# Patient Record
Sex: FEMALE | Race: OTHER | NOT HISPANIC OR LATINO
[De-identification: names, ages, dates, MRNs, and addresses within clinical notes are randomized per-mention and may not be internally consistent; named-entity substitution may affect disease eponyms.]

---
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
## 2022-09-01 ENCOUNTER — LABORATORY RESULT (OUTPATIENT)
Age: 22
End: 2022-09-01

## 2022-09-02 ENCOUNTER — APPOINTMENT (OUTPATIENT)
Dept: OBGYN | Facility: CLINIC | Age: 22
End: 2022-09-02

## 2022-09-02 VITALS
OXYGEN SATURATION: 100 % | SYSTOLIC BLOOD PRESSURE: 121 MMHG | HEART RATE: 99 BPM | WEIGHT: 234 LBS | DIASTOLIC BLOOD PRESSURE: 79 MMHG

## 2022-09-02 DIAGNOSIS — Z30.09 ENCOUNTER FOR OTHER GENERAL COUNSELING AND ADVICE ON CONTRACEPTION: ICD-10-CM

## 2022-09-02 DIAGNOSIS — Z01.419 ENCOUNTER FOR GYNECOLOGICAL EXAMINATION (GENERAL) (ROUTINE) W/OUT ABNORMAL FINDINGS: ICD-10-CM

## 2022-09-02 PROCEDURE — 99214 OFFICE O/P EST MOD 30 MIN: CPT

## 2022-09-02 NOTE — HISTORY OF PRESENT ILLNESS
[FreeTextEntry1] : here for BC consult \par stopped the GENEVA\par was on GENEVA for 2 years, gained 50 lbs \par desires paragard \par

## 2022-09-09 ENCOUNTER — APPOINTMENT (OUTPATIENT)
Dept: OBGYN | Facility: CLINIC | Age: 22
End: 2022-09-09

## 2022-09-09 VITALS — DIASTOLIC BLOOD PRESSURE: 77 MMHG | WEIGHT: 237 LBS | SYSTOLIC BLOOD PRESSURE: 122 MMHG

## 2022-09-09 DIAGNOSIS — Z30.430 ENCOUNTER FOR INSERTION OF INTRAUTERINE CONTRACEPTIVE DEVICE: ICD-10-CM

## 2022-09-09 PROCEDURE — 58300 INSERT INTRAUTERINE DEVICE: CPT

## 2022-10-07 ENCOUNTER — APPOINTMENT (OUTPATIENT)
Dept: OBGYN | Facility: CLINIC | Age: 22
End: 2022-10-07

## 2022-10-07 ENCOUNTER — ASOB RESULT (OUTPATIENT)
Age: 22
End: 2022-10-07

## 2022-10-07 VITALS
HEART RATE: 88 BPM | DIASTOLIC BLOOD PRESSURE: 78 MMHG | OXYGEN SATURATION: 98 % | SYSTOLIC BLOOD PRESSURE: 119 MMHG | WEIGHT: 239 LBS

## 2022-10-07 PROCEDURE — 76830 TRANSVAGINAL US NON-OB: CPT

## 2023-02-24 ENCOUNTER — NON-APPOINTMENT (OUTPATIENT)
Age: 23
End: 2023-02-24

## 2023-03-01 ENCOUNTER — APPOINTMENT (OUTPATIENT)
Dept: BARIATRICS | Facility: CLINIC | Age: 23
End: 2023-03-01
Payer: MEDICAID

## 2023-03-01 VITALS
TEMPERATURE: 97.6 F | WEIGHT: 239 LBS | SYSTOLIC BLOOD PRESSURE: 136 MMHG | HEART RATE: 112 BPM | OXYGEN SATURATION: 100 % | HEIGHT: 64 IN | DIASTOLIC BLOOD PRESSURE: 84 MMHG | BODY MASS INDEX: 40.8 KG/M2

## 2023-03-01 DIAGNOSIS — Z00.00 ENCOUNTER FOR GENERAL ADULT MEDICAL EXAMINATION W/OUT ABNORMAL FINDINGS: ICD-10-CM

## 2023-03-01 PROCEDURE — 99204 OFFICE O/P NEW MOD 45 MIN: CPT

## 2023-03-01 NOTE — ADDENDUM
[FreeTextEntry1] : It was my pleasure to interact with Ms. SOUTH UMANZOR today. In summary, Ms. SOUTH UMANZOR has morbid obesity refractory to medical and dietary efforts for which She could benefit from weight loss surgery.  We discussed in detail the Taylor-en-Y gastric bypass, sleeve gastrectomy, and modified duodenal switch (NIRMAL/SIPS). She understood that these procedures are done primarily minimally invasively (laparoscopically or robotically), but that there is a possibility of conversion to laparotomy. In this particular case, with their body mass index we discussed realistic expectations with respect to weight loss.  Approximately 50% of excess weight will be lost if She has gastric bypass or sleeve gastrectomy, or, approximately 60% if She  has NIRMAL/SIPS.  In order to maintain weight loss or lose more weight, She will be required to modify diet and exercise habits (the "tool" concept of weight loss surgery).  We discussed the necessity for continuous, life-long medical care following surgery and the necessity for taking mineral and vitamin supplements daily for the rest of life. We discussed the importance of high protein diet and daily exercise for maximal success.\par \par We discussed complications that may follow bariatric surgery that include, but are not limited to, respiratory problems, pneumonia, thrombophlebitis, and pulmonary embolus.  We also discussed intra-abdominal complications including anastomotic leak, intra-abdominal infections, portal vein thrombosis and death that may result from bariatric surgery.  We discussed that there may be other complications related to a specific type of surgery, such as that gastric bypass patients may have severe dumping secondary to dietary indiscretion. With respect to sleeve gastrectomy we discussed the chances of having refractory GERD that may require intervention in the future including conversion to gastric bypass. We also discussed postoperative DVT prophylaxis to decrease the incidence of deep vein thrombosis when indicated. \par \par I specifically addressed the patient regarding pregnancy.  Weight loss surgery patients should not become pregnant in the first two years following surgery because of the high risk of fetal malformation and miscarriage.\par \par The prolonged discussion (greater than 45 minutes) centered primarily on the indications for surgery and evaluation of the risk/benefit ratio for Ms. SOUTH UMANZOR and other weight loss alternatives. I answered all questions about bariatric surgery and possible complications to the best of my ability.\par \par Once the preoperative evaluation is complete, I will review the chart and schedule the patient for a follow-up visit in order to answer any questions prior to scheduling surgery.\par \par Plan:\par Nutrition, Psych evaluations\par Medical, pulm evaluations\par EGD\par Interested in sleeve gastrectomy\par \par I, Dr. Reyna Rasmussen, spent 50 minutes with the patient >50% counseling/coordination of care including, reviewing the patient's history, performing an examination, reviewing relevant labs and radiographic imaging, reviewing PCP and consultant notes, discussion of medical and surgical management of the diagnosis as well as associated risks and benefits, and completing documentation.\par \par

## 2023-03-01 NOTE — ASSESSMENT
[FreeTextEntry1] : Pt is a 21 y/o F with pmhx of morbid obesity BMI 41, anxiety and depression following psychology, who presents today feeling well here for evaluation for bariatric sx. Pt states she has struggled with her wt since childhood and reports her current wt of 239 lbs is the most she has ever weighed. Pt denies any daily GERD symptoms but reports experiencing symptoms primarily during the summer months, pt not sure why. Denies ever having an EGD, will obtain preop. Reports previous tooth extraction sx, denies any abdominal procedures. Reports penicillin allergy. States she currently works as a  and is in school right now for ELS before starting [employer]. Denies any vaping or drug use, reports social use. States she currently lives at home with her family and she has not told her family as of yet that she is pursuing wt loss sx. Recommended pt to rewatch the online seminar with her family so they are more familiar with bariatric sx. Pt expresses interest in VSG.

## 2023-03-01 NOTE — HISTORY OF PRESENT ILLNESS
[de-identified] : Pt is a 23 y/o F with pmhx of morbid obesity BMI 41, anxiety and depression following psychology, who presents today feeling well here for evaluation for bariatric sx. Pt states she has struggled with her wt since childhood and reports her current wt of 239 lbs is the most she has ever weighed. Pt denies any daily GERD symptoms but reports experiencing symptoms primarily during the summer months, pt not sure why. Denies ever having an EGD, will obtain preop. Reports previous tooth extraction sx, denies any abdominal procedures. Reports penicillin allergy. States she currently works as a  and is in school right now for ELS before starting [employer]. Denies any vaping or drug use, reports social use. States she currently lives at home with her family and she has not told her family as of yet that she is pursuing wt loss sx. Recommended pt to rewatch the online seminar with her family so they are more familiar with bariatric sx. Pt expresses interest in VSG. We discussed at length surgical and non-surgical options and that non surgical approaches are unlikely to lead to long term, sustained weight loss. We also discussed that surgery alone is unlikely to be successful but should rather be seen as a tool for weight loss to be integrated with physical activity and nutritional counseling. The patient verbalized understanding and agrees to proceed with the evaluation. All risks of surgery were explained to the patient including the risks of leaks, infections, blood clots and death.\par \par \par

## 2023-03-01 NOTE — PLAN
[FreeTextEntry1] : pt to begin workup for bariatric sx\par obtain psych eval from personal psych and \par pt interested in VSG

## 2023-03-07 ENCOUNTER — NON-APPOINTMENT (OUTPATIENT)
Age: 23
End: 2023-03-07

## 2023-03-08 ENCOUNTER — OUTPATIENT (OUTPATIENT)
Dept: OUTPATIENT SERVICES | Facility: HOSPITAL | Age: 23
LOS: 1 days | End: 2023-03-08
Payer: COMMERCIAL

## 2023-03-08 ENCOUNTER — APPOINTMENT (OUTPATIENT)
Dept: PULMONOLOGY | Facility: CLINIC | Age: 23
End: 2023-03-08
Payer: MEDICAID

## 2023-03-08 VITALS
BODY MASS INDEX: 41.66 KG/M2 | OXYGEN SATURATION: 100 % | HEART RATE: 104 BPM | WEIGHT: 244 LBS | TEMPERATURE: 98.2 F | DIASTOLIC BLOOD PRESSURE: 85 MMHG | HEIGHT: 64 IN | SYSTOLIC BLOOD PRESSURE: 137 MMHG

## 2023-03-08 DIAGNOSIS — Z91.09 OTHER ALLERGY STATUS, OTHER THAN TO DRUGS AND BIOLOGICAL SUBSTANCES: ICD-10-CM

## 2023-03-08 DIAGNOSIS — Z82.49 FAMILY HISTORY OF ISCHEMIC HEART DISEASE AND OTHER DISEASES OF THE CIRCULATORY SYSTEM: ICD-10-CM

## 2023-03-08 DIAGNOSIS — Z83.49 FAMILY HISTORY OF OTHER ENDOCRINE, NUTRITIONAL AND METABOLIC DISEASES: ICD-10-CM

## 2023-03-08 DIAGNOSIS — R06.83 SNORING: ICD-10-CM

## 2023-03-08 DIAGNOSIS — Z83.3 FAMILY HISTORY OF DIABETES MELLITUS: ICD-10-CM

## 2023-03-08 PROCEDURE — 71046 X-RAY EXAM CHEST 2 VIEWS: CPT | Mod: 26

## 2023-03-08 PROCEDURE — 71046 X-RAY EXAM CHEST 2 VIEWS: CPT

## 2023-03-08 PROCEDURE — 99204 OFFICE O/P NEW MOD 45 MIN: CPT

## 2023-03-08 NOTE — PHYSICAL EXAM
[No Acute Distress] : no acute distress [Normal Oropharynx] : normal oropharynx [IV] : Mallampati Class: IV [Normal Appearance] : normal appearance [No Neck Mass] : no neck mass [Normal Rate/Rhythm] : normal rate/rhythm [Normal S1, S2] : normal s1, s2 [No Murmurs] : no murmurs [No Resp Distress] : no resp distress [Clear to Auscultation Bilaterally] : clear to auscultation bilaterally [No Abnormalities] : no abnormalities [Benign] : benign [Normal Gait] : normal gait [No Clubbing] : no clubbing [No Cyanosis] : no cyanosis [No Edema] : no edema [FROM] : FROM [Normal Color/ Pigmentation] : normal color/ pigmentation [Oriented x3] : oriented x3 [Normal Affect] : normal affect

## 2023-03-09 NOTE — CONSULT LETTER
[Dear  ___] : Dear  [unfilled], [Consult Letter:] : I had the pleasure of evaluating your patient, [unfilled]. [Please see my note below.] : Please see my note below. [Consult Closing:] : Thank you very much for allowing me to participate in the care of this patient.  If you have any questions, please do not hesitate to contact me. [FreeTextEntry3] : Sincerely\par \par Boone Olivas MD [facility]\par , [facility] [employer]\par Associate , Pulmonary and Critical Care Fellowship\par Pulmonary and Critical Care\par [facility]\par Phone: [phone]\par

## 2023-03-09 NOTE — REVIEW OF SYSTEMS
[Seasonal Allergies] : seasonal allergies [Depression] : depression [Anxiety] : anxiety [Obesity] : obesity [Negative] : Neurologic [Rash] : no rash

## 2023-03-09 NOTE — ASSESSMENT
[FreeTextEntry1] : Reviewed:\par -Bariatric surgery notes\par -Labs--CBC w/ no eos \par \par #allergies\par Environmental allergies and wheeze previously requring inhaler and ED visits for nebulizers but not currently using inhalers or sx since January. No family hx of pulmoary disease. Unclear if underlying asthma or reactive airways to environmental exposures. Exam benign today. \par -will obtain PFTs today and 6MWT\par -will hold off on inhalers for now but may warrant asthma panel or further work up since sx are only intermittent and with certain exposures and not persistent\par \par #pre-op \par No prior surgies would likely be low risk ARISCAT score 15 for pulmonary complications post surgery. Although will review PFTs as above given sx above. \par -CXR ordered\par -HST ordered \par \par Follow up when HST and PFTs complete to discuss results. \par \par \par

## 2023-03-09 NOTE — HISTORY OF PRESENT ILLNESS
[Never] : never [TextBox_4] : Pt is a 21 y/o F with pmhx of morbid obesity BMI 41, anxiety and depression following psychology, who presents for pre-op pulmonary evaluation for bariatric sx. \par \par Pt reports hx of allergies with episodes of cough and wheezing/sneezing if exposed to strong smells/irritants or cats. She was seeing an allergist for desensitization in fall of 2022 but made sx worse and required inhalers so stopped the process. She is not currently taking any inhalers. Gets seasonal allergies as well. Last episode of wheezing in 1/22 after exposure to cat. Previously required ED visits for nebulizer treatments but last ED visit was 2014. No nocturnal sx. Snores at night, unknown if witnessed apneas. Family hx of snoring but no pulmonary problems or dx of sleep disorders. ESS 4. Works as . No smoking or vaping. No prior anesthesia. Denies shortness of breath, chest pain. \par  [ESS] : 4

## 2023-03-20 ENCOUNTER — APPOINTMENT (OUTPATIENT)
Dept: BARIATRICS | Facility: CLINIC | Age: 23
End: 2023-03-20

## 2023-03-31 ENCOUNTER — APPOINTMENT (OUTPATIENT)
Dept: PULMONOLOGY | Facility: CLINIC | Age: 23
End: 2023-03-31
Payer: MEDICAID

## 2023-03-31 VITALS
OXYGEN SATURATION: 99 % | HEART RATE: 77 BPM | WEIGHT: 236 LBS | TEMPERATURE: 97.9 F | DIASTOLIC BLOOD PRESSURE: 80 MMHG | RESPIRATION RATE: 12 BRPM | SYSTOLIC BLOOD PRESSURE: 115 MMHG | HEIGHT: 64 IN | BODY MASS INDEX: 40.29 KG/M2

## 2023-03-31 PROCEDURE — 94618 PULMONARY STRESS TESTING: CPT

## 2023-03-31 PROCEDURE — 94010 BREATHING CAPACITY TEST: CPT

## 2023-03-31 PROCEDURE — 94729 DIFFUSING CAPACITY: CPT

## 2023-03-31 PROCEDURE — 99214 OFFICE O/P EST MOD 30 MIN: CPT | Mod: 25

## 2023-03-31 PROCEDURE — 94726 PLETHYSMOGRAPHY LUNG VOLUMES: CPT

## 2023-03-31 PROCEDURE — ZZZZZ: CPT

## 2023-03-31 NOTE — HISTORY OF PRESENT ILLNESS
[Never] : never [TextBox_4] : Pt is a 21 y/o F with pmhx of morbid obesity BMI 41, anxiety and depression following psychology, who presents for pre-op pulmonary evaluation for bariatric sx. \par \par Pt reports hx of allergies with episodes of cough and wheezing/sneezing if exposed to strong smells/irritants or cats. She was seeing an allergist for desensitization in fall of 2022 but made sx worse and required inhalers so stopped the process. She is not currently taking any inhalers. Gets seasonal allergies as well. Last episode of wheezing in 1/22 after exposure to cat. Previously required ED visits for nebulizer treatments but last ED visit was 2014. No nocturnal sx. Snores at night, unknown if witnessed apneas. Family hx of snoring but no pulmonary problems or dx of sleep disorders. ESS 4. Works as . No smoking or vaping. No prior anesthesia. Denies shortness of breath, chest pain. \par \par 3/31/23:\par PFT was done. Patient picked up sleep study equipment today.

## 2023-03-31 NOTE — PHYSICAL EXAM
[No Acute Distress] : no acute distress [Well Nourished] : well nourished [Normal Oropharynx] : normal oropharynx [II] : Mallampati Class: II [Normal Appearance] : normal appearance [Normal Rate/Rhythm] : normal rate/rhythm [Normal S1, S2] : normal s1, s2 [No Resp Distress] : no resp distress [Clear to Auscultation Bilaterally] : clear to auscultation bilaterally [Benign] : benign [No Clubbing] : no clubbing [No Cyanosis] : no cyanosis

## 2023-03-31 NOTE — REVIEW OF SYSTEMS
[Fever] : no fever [Chills] : no chills [Dry Eyes] : no dry eyes [Eye Irritation] : no eye irritation [Cough] : no cough [Sputum] : no sputum [Chest Discomfort] : no chest discomfort [Orthopnea] : no orthopnea [Hay Fever] : no hay fever [Nasal Discharge] : no nasal discharge [GERD] : no gerd [Diarrhea] : no diarrhea [Arthralgias] : no arthralgias [Trauma/ Injury] : no trauma/ injury [Raynaud] : no raynaud [Telangiectasias] : no telangiectasias

## 2023-03-31 NOTE — DISCUSSION/SUMMARY
[FreeTextEntry1] : Reviewed:\par -Bariatric surgery notes\par -Labs--CBC w/ no eos \par \par #allergies\par Environmental allergies and wheeze previously requring inhaler and ED visits for nebulizers but not currently using inhalers or sx since January. No family hx of pulmoary disease. Unclear if underlying asthma or reactive airways to environmental exposures. Exam benign today. PFT showed normal spirometery \par -will hold off on inhalers for now but may warrant asthma panel or further work up since sx are only intermittent and with certain exposures and not persistent\par \par #pre-op \par No prior surgies would likely be low risk ARISCAT score 15 for pulmonary complications post surgery. Although will review PFTs as above given sx above. \par -CXR did not show any abnormality.\par -HST ordered (still pending)\par \par Follow up after HST

## 2023-04-03 ENCOUNTER — APPOINTMENT (OUTPATIENT)
Dept: SLEEP CENTER | Facility: HOME HEALTH | Age: 23
End: 2023-04-03
Payer: MEDICAID

## 2023-04-03 ENCOUNTER — RESULT REVIEW (OUTPATIENT)
Age: 23
End: 2023-04-03

## 2023-04-03 ENCOUNTER — OUTPATIENT (OUTPATIENT)
Dept: OUTPATIENT SERVICES | Facility: HOSPITAL | Age: 23
LOS: 1 days | Discharge: HOME | End: 2023-04-03
Payer: COMMERCIAL

## 2023-04-03 ENCOUNTER — TRANSCRIPTION ENCOUNTER (OUTPATIENT)
Age: 23
End: 2023-04-03

## 2023-04-03 ENCOUNTER — OUTPATIENT (OUTPATIENT)
Dept: OUTPATIENT SERVICES | Facility: HOSPITAL | Age: 23
LOS: 1 days | End: 2023-04-03
Payer: COMMERCIAL

## 2023-04-03 VITALS — BODY MASS INDEX: 40.46 KG/M2 | WEIGHT: 237 LBS | HEIGHT: 64 IN

## 2023-04-03 VITALS — HEIGHT: 64 IN | WEIGHT: 235.89 LBS

## 2023-04-03 PROCEDURE — 95800 SLP STDY UNATTENDED: CPT

## 2023-04-03 PROCEDURE — 43239 EGD BIOPSY SINGLE/MULTIPLE: CPT

## 2023-04-03 PROCEDURE — 88305 TISSUE EXAM BY PATHOLOGIST: CPT | Mod: 26

## 2023-04-03 PROCEDURE — 88305 TISSUE EXAM BY PATHOLOGIST: CPT

## 2023-04-03 PROCEDURE — 95800 SLP STDY UNATTENDED: CPT | Mod: 26

## 2023-04-04 DIAGNOSIS — G47.33 OBSTRUCTIVE SLEEP APNEA (ADULT) (PEDIATRIC): ICD-10-CM

## 2023-04-04 LAB — SURGICAL PATHOLOGY STUDY: SIGNIFICANT CHANGE UP

## 2023-04-05 ENCOUNTER — APPOINTMENT (OUTPATIENT)
Dept: PULMONOLOGY | Facility: CLINIC | Age: 23
End: 2023-04-05

## 2023-04-10 ENCOUNTER — NON-APPOINTMENT (OUTPATIENT)
Age: 23
End: 2023-04-10

## 2023-04-10 LAB
25(OH)D3 SERPL-MCNC: 22.5 NG/ML
A-TOCOPHEROL VIT E SERPL-MCNC: 8.2 MG/L
ALBUMIN SERPL ELPH-MCNC: 4.3 G/DL
ALP BLD-CCNC: 73 U/L
ALT SERPL-CCNC: 19 U/L
ANION GAP SERPL CALC-SCNC: 16 MMOL/L
APPEARANCE: CLEAR
AST SERPL-CCNC: 16 U/L
BACTERIA: NEGATIVE
BASOPHILS # BLD AUTO: 0.06 K/UL
BASOPHILS NFR BLD AUTO: 0.5 %
BETA+GAMMA TOCOPHEROL SERPL-MCNC: 1.4 MG/L
BILIRUB SERPL-MCNC: 0.3 MG/DL
BILIRUBIN URINE: NEGATIVE
BLOOD URINE: NEGATIVE
BUN SERPL-MCNC: 9 MG/DL
CA-I SERPL-SCNC: 4.9 MG/DL
CALCIUM SERPL-MCNC: 9.4 MG/DL
CALCIUM SERPL-MCNC: 9.4 MG/DL
CHLORIDE SERPL-SCNC: 102 MMOL/L
CHOLEST SERPL-MCNC: 170 MG/DL
CO2 SERPL-SCNC: 22 MMOL/L
COLOR: YELLOW
CREAT SERPL-MCNC: 0.71 MG/DL
EGFR: 125 ML/MIN/1.73M2
EOSINOPHIL # BLD AUTO: 0.15 K/UL
EOSINOPHIL NFR BLD AUTO: 1.2 %
ESTIMATED AVERAGE GLUCOSE: 108 MG/DL
FOLATE SERPL-MCNC: 16.4 NG/ML
GLUCOSE QUALITATIVE U: NEGATIVE
GLUCOSE SERPL-MCNC: 72 MG/DL
HBA1C MFR BLD HPLC: 5.4 %
HCG SERPL QL: NEGATIVE
HCT VFR BLD CALC: 43.3 %
HDLC SERPL-MCNC: 41 MG/DL
HGB BLD-MCNC: 14 G/DL
HYALINE CASTS: 0 /LPF
IMM GRANULOCYTES NFR BLD AUTO: 0.4 %
INR PPP: 1.06 RATIO
IRON SATN MFR SERPL: 13 %
IRON SERPL-MCNC: 47 UG/DL
KETONES URINE: NEGATIVE
LDLC SERPL CALC-MCNC: 104 MG/DL
LEUKOCYTE ESTERASE URINE: ABNORMAL
LYMPHOCYTES # BLD AUTO: 3.73 K/UL
LYMPHOCYTES NFR BLD AUTO: 29.3 %
MAN DIFF?: NORMAL
MCHC RBC-ENTMCNC: 29.2 PG
MCHC RBC-ENTMCNC: 32.3 GM/DL
MCV RBC AUTO: 90.4 FL
MICROSCOPIC-UA: NORMAL
MONOCYTES # BLD AUTO: 0.57 K/UL
MONOCYTES NFR BLD AUTO: 4.5 %
NEUTROPHILS # BLD AUTO: 8.15 K/UL
NEUTROPHILS NFR BLD AUTO: 64.1 %
NITRITE URINE: NEGATIVE
NONHDLC SERPL-MCNC: 129 MG/DL
PAPP-A SERPL-ACNC: <1 MIU/ML
PARATHYROID HORMONE INTACT: 46 PG/ML
PH URINE: 6
PLATELET # BLD AUTO: 405 K/UL
POTASSIUM SERPL-SCNC: 4.2 MMOL/L
PREALB SERPL NEPH-MCNC: 28 MG/DL
PROT SERPL-MCNC: 7.4 G/DL
PROTEIN URINE: NEGATIVE
PT BLD: 12.3 SEC
RBC # BLD: 4.79 M/UL
RBC # FLD: 13.4 %
RED BLOOD CELLS URINE: 7 /HPF
SODIUM SERPL-SCNC: 141 MMOL/L
SPECIFIC GRAVITY URINE: >=1.03
SQUAMOUS EPITHELIAL CELLS: 6 /HPF
TIBC SERPL-MCNC: 365 UG/DL
TRIGL SERPL-MCNC: 122 MG/DL
TSH SERPL-ACNC: 2.33 UIU/ML
UIBC SERPL-MCNC: 318 UG/DL
UROBILINOGEN URINE: NORMAL
VIT A SERPL-MCNC: 37.9 UG/DL
VIT B1 SERPL-MCNC: 219.6 NMOL/L
VIT B12 SERPL-MCNC: 491 PG/ML
WBC # FLD AUTO: 12.71 K/UL
WHITE BLOOD CELLS URINE: 16 /HPF
ZINC SERPL-MCNC: 99 UG/DL

## 2023-04-17 ENCOUNTER — APPOINTMENT (OUTPATIENT)
Dept: BARIATRICS | Facility: CLINIC | Age: 23
End: 2023-04-17

## 2023-04-19 ENCOUNTER — APPOINTMENT (OUTPATIENT)
Dept: PULMONOLOGY | Facility: CLINIC | Age: 23
End: 2023-04-19
Payer: MEDICAID

## 2023-04-19 DIAGNOSIS — Z01.811 ENCOUNTER FOR PREPROCEDURAL RESPIRATORY EXAMINATION: ICD-10-CM

## 2023-04-19 PROCEDURE — 99442: CPT

## 2023-04-19 NOTE — HISTORY OF PRESENT ILLNESS
[Home] : at home, [unfilled] , at the time of the visit. [Medical Office: ([facility])___] : at the medical office located in  [Verbal consent obtained from patient] : the patient, [unfilled] [Never] : never [TextBox_4] : Pt is a 21 y/o F with pmhx of morbid obesity BMI 41, anxiety and depression following psychology, who presents for pre-op pulmonary evaluation for bariatric sx. \par \par Pt reports hx of allergies with episodes of cough and wheezing/sneezing if exposed to strong smells/irritants or cats. She was seeing an allergist for desensitization in fall of 2022 but made sx worse and required inhalers so stopped the process. She is not currently taking any inhalers. Gets seasonal allergies as well. Last episode of wheezing in 1/22 after exposure to cat. Previously required ED visits for nebulizer treatments but last ED visit was 2014. No nocturnal sx. Snores at night, unknown if witnessed apneas. Family hx of snoring but no pulmonary problems or dx of sleep disorders. ESS 4. Works as . No smoking or vaping. No prior anesthesia. Denies shortness of breath, chest pain. \par \par 3/31/23:\par PFT was done. Patient picked up sleep study equipment today. \par \par 4/19/23:\par Sleep study was done. TTM to discuss results and clearance for surgery.

## 2023-04-19 NOTE — DISCUSSION/SUMMARY
[FreeTextEntry1] : Reviewed:\par -Bariatric surgery notes\par -Labs--CBC w/ no eos \par -CXR did not show any abnormality.\par - Home sleep study (mild sleep apnea from 3% desaturation criteria)\par \par (1) Preop Pulmonary exam:\par No prior of prior surgery. Good functional status. No history of recent hospitalization for pulmonary illness. Patient may undergo bariatric surgery. Would likely be low risk ARISCAT score 15 for pulmonary complications post surgery. Would recommend to use albuterol nebulization before intubation to avoid bronchospasm. Anesthesia precautions for mild sleep apnea. \par \par (2) History suggestive of asthma (resolved at present)\par Environmental allergies and wheeze previously requring inhaler and ED visits for nebulizers but not currently using inhalers or sx since January. No family hx of pulmoary disease. Unclear if underlying asthma or reactive airways to environmental exposures. Exam benign today. PFT showed normal spirometery \par -will hold off on inhalers for now but may warrant asthma panel or further work up since sx are only intermittent and with certain exposures and not persistent\par

## 2023-04-19 NOTE — CONSULT LETTER
[Dear  ___] : Dear  [unfilled], [Consult Letter:] : I had the pleasure of evaluating your patient, [unfilled]. [Please see my note below.] : Please see my note below. [Consult Closing:] : Thank you very much for allowing me to participate in the care of this patient.  If you have any questions, please do not hesitate to contact me. [FreeTextEntry3] : Sincerely\par \par Boone Olivas MD [facility]\par , [facility] [employer]\par Associate , Pulmonary and Critical Care Fellowship\par Pulmonary and Critical Care\par [facility]\par Phone: [phone]\par

## 2023-05-04 VITALS — WEIGHT: 239.13 LBS | HEIGHT: 64 IN | BODY MASS INDEX: 40.83 KG/M2

## 2023-05-05 ENCOUNTER — APPOINTMENT (OUTPATIENT)
Dept: BARIATRICS | Facility: CLINIC | Age: 23
End: 2023-05-05
Payer: MEDICAID

## 2023-05-05 VITALS — WEIGHT: 239 LBS

## 2023-05-05 PROCEDURE — 98966 PH1 ASSMT&MGMT NQHP 5-10: CPT | Mod: NC

## 2023-06-05 VITALS — BODY MASS INDEX: 41.32 KG/M2 | WEIGHT: 242 LBS | HEIGHT: 64 IN

## 2023-06-16 ENCOUNTER — APPOINTMENT (OUTPATIENT)
Dept: BARIATRICS | Facility: CLINIC | Age: 23
End: 2023-06-16

## 2023-07-05 VITALS — HEIGHT: 64 IN | WEIGHT: 244 LBS | BODY MASS INDEX: 41.66 KG/M2

## 2023-07-18 ENCOUNTER — APPOINTMENT (OUTPATIENT)
Dept: BARIATRICS | Facility: CLINIC | Age: 23
End: 2023-07-18
Payer: MEDICAID

## 2023-07-18 VITALS — WEIGHT: 244 LBS

## 2023-07-18 PROCEDURE — 97803 MED NUTRITION INDIV SUBSEQ: CPT | Mod: NC,95

## 2023-07-19 ENCOUNTER — LABORATORY RESULT (OUTPATIENT)
Age: 23
End: 2023-07-19

## 2023-07-19 ENCOUNTER — APPOINTMENT (OUTPATIENT)
Dept: BARIATRICS | Facility: CLINIC | Age: 23
End: 2023-07-19
Payer: MEDICAID

## 2023-07-19 VITALS
WEIGHT: 242 LBS | BODY MASS INDEX: 41.32 KG/M2 | DIASTOLIC BLOOD PRESSURE: 83 MMHG | HEIGHT: 64 IN | HEART RATE: 98 BPM | TEMPERATURE: 97.6 F | SYSTOLIC BLOOD PRESSURE: 123 MMHG | OXYGEN SATURATION: 100 %

## 2023-07-19 DIAGNOSIS — E66.01 MORBID (SEVERE) OBESITY DUE TO EXCESS CALORIES: ICD-10-CM

## 2023-07-19 DIAGNOSIS — F41.9 ANXIETY DISORDER, UNSPECIFIED: ICD-10-CM

## 2023-07-19 DIAGNOSIS — K44.9 DIAPHRAGMATIC HERNIA W/OUT OBSTRUCTION OR GANGRENE: ICD-10-CM

## 2023-07-19 DIAGNOSIS — F32.A ANXIETY DISORDER, UNSPECIFIED: ICD-10-CM

## 2023-07-19 PROCEDURE — 99214 OFFICE O/P EST MOD 30 MIN: CPT

## 2023-07-25 ENCOUNTER — APPOINTMENT (OUTPATIENT)
Dept: BARIATRICS | Facility: CLINIC | Age: 23
End: 2023-07-25

## 2023-07-25 ENCOUNTER — NON-APPOINTMENT (OUTPATIENT)
Age: 23
End: 2023-07-25

## 2023-08-01 ENCOUNTER — APPOINTMENT (OUTPATIENT)
Dept: BARIATRICS | Facility: CLINIC | Age: 23
End: 2023-08-01
Payer: MEDICAID

## 2023-08-01 PROCEDURE — 97803 MED NUTRITION INDIV SUBSEQ: CPT | Mod: NC,95

## 2023-08-08 ENCOUNTER — TRANSCRIPTION ENCOUNTER (OUTPATIENT)
Age: 23
End: 2023-08-08

## 2023-08-09 ENCOUNTER — RESULT REVIEW (OUTPATIENT)
Age: 23
End: 2023-08-09

## 2023-08-09 ENCOUNTER — INPATIENT (INPATIENT)
Facility: HOSPITAL | Age: 23
LOS: 1 days | Discharge: HOME | DRG: 621 | End: 2023-08-11
Attending: STUDENT IN AN ORGANIZED HEALTH CARE EDUCATION/TRAINING PROGRAM | Admitting: STUDENT IN AN ORGANIZED HEALTH CARE EDUCATION/TRAINING PROGRAM
Payer: COMMERCIAL

## 2023-08-09 ENCOUNTER — APPOINTMENT (OUTPATIENT)
Dept: BARIATRICS | Facility: HOSPITAL | Age: 23
End: 2023-08-09

## 2023-08-09 VITALS
HEIGHT: 62 IN | HEART RATE: 95 BPM | SYSTOLIC BLOOD PRESSURE: 119 MMHG | OXYGEN SATURATION: 99 % | WEIGHT: 240.97 LBS | RESPIRATION RATE: 16 BRPM | DIASTOLIC BLOOD PRESSURE: 66 MMHG | TEMPERATURE: 98 F

## 2023-08-09 DIAGNOSIS — Z92.89 PERSONAL HISTORY OF OTHER MEDICAL TREATMENT: Chronic | ICD-10-CM

## 2023-08-09 LAB
GLUCOSE BLDC GLUCOMTR-MCNC: 89 MG/DL — SIGNIFICANT CHANGE UP (ref 70–99)
HCT VFR BLD CALC: 38.3 % — SIGNIFICANT CHANGE UP (ref 34.5–45)
HGB BLD-MCNC: 12.9 G/DL — SIGNIFICANT CHANGE UP (ref 11.5–15.5)
MCHC RBC-ENTMCNC: 29.5 PG — SIGNIFICANT CHANGE UP (ref 27–34)
MCHC RBC-ENTMCNC: 33.7 GM/DL — SIGNIFICANT CHANGE UP (ref 32–36)
MCV RBC AUTO: 87.6 FL — SIGNIFICANT CHANGE UP (ref 80–100)
NRBC # BLD: 0 /100 WBCS — SIGNIFICANT CHANGE UP (ref 0–0)
PLATELET # BLD AUTO: 269 K/UL — SIGNIFICANT CHANGE UP (ref 150–400)
RBC # BLD: 4.37 M/UL — SIGNIFICANT CHANGE UP (ref 3.8–5.2)
RBC # FLD: 12.9 % — SIGNIFICANT CHANGE UP (ref 10.3–14.5)
WBC # BLD: 13.86 K/UL — HIGH (ref 3.8–10.5)
WBC # FLD AUTO: 13.86 K/UL — HIGH (ref 3.8–10.5)

## 2023-08-09 PROCEDURE — 43775 LAP SLEEVE GASTRECTOMY: CPT | Mod: AS

## 2023-08-09 PROCEDURE — 43281 LAP PARAESOPHAG HERN REPAIR: CPT | Mod: AS,59

## 2023-08-09 PROCEDURE — 88302 TISSUE EXAM BY PATHOLOGIST: CPT | Mod: 26

## 2023-08-09 PROCEDURE — 88307 TISSUE EXAM BY PATHOLOGIST: CPT | Mod: 26

## 2023-08-09 PROCEDURE — 43775 LAP SLEEVE GASTRECTOMY: CPT

## 2023-08-09 PROCEDURE — 99221 1ST HOSP IP/OBS SF/LOW 40: CPT

## 2023-08-09 PROCEDURE — S2900 ROBOTIC SURGICAL SYSTEM: CPT | Mod: NC

## 2023-08-09 PROCEDURE — 43281 LAP PARAESOPHAG HERN REPAIR: CPT | Mod: XS

## 2023-08-09 DEVICE — XI STAPLER SUREFORM RELOAD 60 BLUE: Type: IMPLANTABLE DEVICE | Status: FUNCTIONAL

## 2023-08-09 DEVICE — XI STAPLER SUREFORM RELOAD 60 GREEN: Type: IMPLANTABLE DEVICE | Status: FUNCTIONAL

## 2023-08-09 DEVICE — ARISTA 3GR: Type: IMPLANTABLE DEVICE | Status: FUNCTIONAL

## 2023-08-09 RX ORDER — HYOSCYAMINE SULFATE 0.13 MG
0.12 TABLET ORAL EVERY 6 HOURS
Refills: 0 | Status: DISCONTINUED | OUTPATIENT
Start: 2023-08-09 | End: 2023-08-11

## 2023-08-09 RX ORDER — ACETAMINOPHEN 500 MG
650 TABLET ORAL EVERY 6 HOURS
Refills: 0 | Status: DISCONTINUED | OUTPATIENT
Start: 2023-08-09 | End: 2023-08-11

## 2023-08-09 RX ORDER — OXYCODONE HYDROCHLORIDE 5 MG/1
2.5 TABLET ORAL EVERY 6 HOURS
Refills: 0 | Status: DISCONTINUED | OUTPATIENT
Start: 2023-08-09 | End: 2023-08-09

## 2023-08-09 RX ORDER — ACETAMINOPHEN 500 MG
1000 TABLET ORAL ONCE
Refills: 0 | Status: COMPLETED | OUTPATIENT
Start: 2023-08-09 | End: 2023-08-09

## 2023-08-09 RX ORDER — ENOXAPARIN SODIUM 100 MG/ML
40 INJECTION SUBCUTANEOUS ONCE
Refills: 0 | Status: COMPLETED | OUTPATIENT
Start: 2023-08-09 | End: 2023-08-09

## 2023-08-09 RX ORDER — APREPITANT 80 MG/1
80 CAPSULE ORAL ONCE
Refills: 0 | Status: COMPLETED | OUTPATIENT
Start: 2023-08-09 | End: 2023-08-09

## 2023-08-09 RX ORDER — SCOPALAMINE 1 MG/3D
1 PATCH, EXTENDED RELEASE TRANSDERMAL ONCE
Refills: 0 | Status: COMPLETED | OUTPATIENT
Start: 2023-08-09 | End: 2023-08-09

## 2023-08-09 RX ORDER — ONDANSETRON 8 MG/1
4 TABLET, FILM COATED ORAL EVERY 6 HOURS
Refills: 0 | Status: DISCONTINUED | OUTPATIENT
Start: 2023-08-09 | End: 2023-08-11

## 2023-08-09 RX ORDER — SODIUM CHLORIDE 9 MG/ML
1000 INJECTION, SOLUTION INTRAVENOUS
Refills: 0 | Status: DISCONTINUED | OUTPATIENT
Start: 2023-08-09 | End: 2023-08-10

## 2023-08-09 RX ORDER — ACETAMINOPHEN 500 MG
650 TABLET ORAL EVERY 6 HOURS
Refills: 0 | Status: DISCONTINUED | OUTPATIENT
Start: 2023-08-09 | End: 2023-08-09

## 2023-08-09 RX ORDER — PANTOPRAZOLE SODIUM 20 MG/1
40 TABLET, DELAYED RELEASE ORAL DAILY
Refills: 0 | Status: DISCONTINUED | OUTPATIENT
Start: 2023-08-09 | End: 2023-08-11

## 2023-08-09 RX ORDER — HYDROMORPHONE HYDROCHLORIDE 2 MG/ML
0.5 INJECTION INTRAMUSCULAR; INTRAVENOUS; SUBCUTANEOUS
Refills: 0 | Status: DISCONTINUED | OUTPATIENT
Start: 2023-08-09 | End: 2023-08-09

## 2023-08-09 RX ORDER — THIAMINE MONONITRATE (VIT B1) 100 MG
500 TABLET ORAL EVERY 24 HOURS
Refills: 0 | Status: DISCONTINUED | OUTPATIENT
Start: 2023-08-09 | End: 2023-08-11

## 2023-08-09 RX ORDER — OXYCODONE HYDROCHLORIDE 5 MG/1
5 TABLET ORAL EVERY 6 HOURS
Refills: 0 | Status: DISCONTINUED | OUTPATIENT
Start: 2023-08-09 | End: 2023-08-11

## 2023-08-09 RX ADMIN — Medication 0.12 MILLIGRAM(S): at 17:50

## 2023-08-09 RX ADMIN — PANTOPRAZOLE SODIUM 40 MILLIGRAM(S): 20 TABLET, DELAYED RELEASE ORAL at 12:36

## 2023-08-09 RX ADMIN — OXYCODONE HYDROCHLORIDE 5 MILLIGRAM(S): 5 TABLET ORAL at 23:00

## 2023-08-09 RX ADMIN — SCOPALAMINE 1 PATCH: 1 PATCH, EXTENDED RELEASE TRANSDERMAL at 07:20

## 2023-08-09 RX ADMIN — ENOXAPARIN SODIUM 40 MILLIGRAM(S): 100 INJECTION SUBCUTANEOUS at 07:18

## 2023-08-09 RX ADMIN — Medication 400 MILLIGRAM(S): at 12:36

## 2023-08-09 RX ADMIN — Medication 1000 MILLIGRAM(S): at 12:50

## 2023-08-09 RX ADMIN — APREPITANT 80 MILLIGRAM(S): 80 CAPSULE ORAL at 07:18

## 2023-08-09 RX ADMIN — SODIUM CHLORIDE 150 MILLILITER(S): 9 INJECTION, SOLUTION INTRAVENOUS at 17:49

## 2023-08-09 RX ADMIN — Medication 1000 MILLIGRAM(S): at 07:17

## 2023-08-09 RX ADMIN — OXYCODONE HYDROCHLORIDE 5 MILLIGRAM(S): 5 TABLET ORAL at 22:08

## 2023-08-09 RX ADMIN — Medication 650 MILLIGRAM(S): at 19:36

## 2023-08-09 NOTE — H&P ADULT - HISTORY OF PRESENT ILLNESS
22F with pmhx of morbid obesity BMI 41, anxiety and depression, mild SHILOH, presenting for elective VSG and hiatal hernia repair. EGD on 4/23 showed 2 cm hiatal hernia, path wnl.   22F with pmhx of morbid obesity BMI 41, anxiety and depression, mild SHILOH (not on CPAP), presenting for elective VSG and hiatal hernia repair. EGD on 4/23 showed 2 cm hiatal hernia, path wnl.      pmhx: obesity, anxiety, depression, mild SHILOH, hiatal hernia  pshx:denies  home meds: denies   allergies: PCN (rash on PCN skin test 2021)

## 2023-08-09 NOTE — BRIEF OPERATIVE NOTE - NSICDXBRIEFPREOP_GEN_ALL_CORE_FT
PRE-OP DIAGNOSIS:  Morbid obesity 09-Aug-2023 11:55:47  Vickey Hall  SHILOH (obstructive sleep apnea) 09-Aug-2023 11:55:54  Vickey Hall

## 2023-08-09 NOTE — BRIEF OPERATIVE NOTE - NSICDXBRIEFPROCEDURE_GEN_ALL_CORE_FT
PROCEDURES:  Robot-assisted laparoscopic sleeve gastrectomy 09-Aug-2023 11:55:25  Vickey Hall  Repair, hiatal hernia, robot-assisted 09-Aug-2023 11:55:37  Vickey Hall

## 2023-08-09 NOTE — H&P ADULT - ASSESSMENT
22F with pmhx of morbid obesity BMI 41, anxiety and depression, mild SHILOH, presenting for elective VSG and hiatal hernia repair. EGD on 4/23 showed 2 cm hiatal hernia, path wnl.      Proceed to OR  Admit to Dr. Rasmussen Post op 22F with pmhx of morbid obesity BMI 41, anxiety and depression, mild SHILOH (not on CPAP), presenting for elective VSG and hiatal hernia repair. EGD on 4/23 showed 2 cm hiatal hernia, path wnl.      Proceed to OR  Admit to Dr. Rasmussen Post op

## 2023-08-09 NOTE — H&P ADULT - NSICDXPASTMEDICALHX_GEN_ALL_CORE_FT
PAST MEDICAL HISTORY:  Diaphragmatic hernia     H/O: depression     Morbid obesity     SHILOH (obstructive sleep apnea)

## 2023-08-09 NOTE — BRIEF OPERATIVE NOTE - NSICDXBRIEFPOSTOP_GEN_ALL_CORE_FT
POST-OP DIAGNOSIS:  SHILOH (obstructive sleep apnea) 09-Aug-2023 11:56:06  Vickey Hall  Morbid obesity 09-Aug-2023 11:56:01  Vickey Hall  Sliding hiatal hernia 09-Aug-2023 11:56:13  Vickey Hall

## 2023-08-09 NOTE — PROGRESS NOTE ADULT - SUBJECTIVE AND OBJECTIVE BOX
Procedure: Robot-assisted laparoscopic sleeve gastrectomy, Repair, hiatal hernia, robot-assisted    Surgeon: Dr. Rasmussen    Subjective: Pt doing well in the post op period. She states that her pain is well controlled, but endorses mild nausea. Denies emesis. Denies cp/sob. Denies f/bm. Denies f/c.    Vital Signs Last 24 Hrs  T(C): 36.4 (09 Aug 2023 11:18), Max: 36.4 (09 Aug 2023 07:10)  T(F): 97.5 (09 Aug 2023 11:18), Max: 97.6 (09 Aug 2023 07:10)  HR: 92 (09 Aug 2023 12:33) (92 - 112)  BP: 144/82 (09 Aug 2023 12:33) (119/66 - 150/80)  BP(mean): 105 (09 Aug 2023 12:33) (102 - 110)  RR: 16 (09 Aug 2023 12:33) (15 - 20)  SpO2: 97% (09 Aug 2023 12:33) (97% - 100%)    Parameters below as of 09 Aug 2023 12:33  Patient On (Oxygen Delivery Method): room air        Physical Exam:  General: NAD, resting comfortably in bed  Pulmonary: Nonlabored breathing, no respiratory distress  Cardiovascular: NSR  Abdominal: soft, appropriately TTP, nondistended, incisions clean/dry/intact  Extremities: WWP, normal strength  Neuro: A/O x 3, CNs II-XII grossly intact, no focal deficits    Assessment:22y Female s/p Robot-assisted laparoscopic sleeve gastrectomy, Repair, hiatal hernia, robot-assisted    Plan:  BCLD/IVF  Pain/nausea control  Levsin q6hrs  Protonix  OOBA/SCD/IS  AM labs  HSQ and toradol after am CBC  Nutrition consult in AM  TOV 7:00PM

## 2023-08-10 ENCOUNTER — TRANSCRIPTION ENCOUNTER (OUTPATIENT)
Age: 23
End: 2023-08-10

## 2023-08-10 LAB
ANION GAP SERPL CALC-SCNC: 9 MMOL/L — SIGNIFICANT CHANGE UP (ref 5–17)
BASOPHILS # BLD AUTO: 0.01 K/UL — SIGNIFICANT CHANGE UP (ref 0–0.2)
BASOPHILS NFR BLD AUTO: 0.1 % — SIGNIFICANT CHANGE UP (ref 0–2)
BUN SERPL-MCNC: 12 MG/DL — SIGNIFICANT CHANGE UP (ref 7–23)
CALCIUM SERPL-MCNC: 8.6 MG/DL — SIGNIFICANT CHANGE UP (ref 8.4–10.5)
CHLORIDE SERPL-SCNC: 102 MMOL/L — SIGNIFICANT CHANGE UP (ref 96–108)
CO2 SERPL-SCNC: 22 MMOL/L — SIGNIFICANT CHANGE UP (ref 22–31)
CREAT SERPL-MCNC: 0.85 MG/DL — SIGNIFICANT CHANGE UP (ref 0.5–1.3)
EGFR: 101 ML/MIN/1.73M2 — SIGNIFICANT CHANGE UP
EOSINOPHIL # BLD AUTO: 0 K/UL — SIGNIFICANT CHANGE UP (ref 0–0.5)
EOSINOPHIL NFR BLD AUTO: 0 % — SIGNIFICANT CHANGE UP (ref 0–6)
GLUCOSE SERPL-MCNC: 104 MG/DL — HIGH (ref 70–99)
HCT VFR BLD CALC: 36.9 % — SIGNIFICANT CHANGE UP (ref 34.5–45)
HGB BLD-MCNC: 12.2 G/DL — SIGNIFICANT CHANGE UP (ref 11.5–15.5)
IMM GRANULOCYTES NFR BLD AUTO: 0.5 % — SIGNIFICANT CHANGE UP (ref 0–0.9)
LYMPHOCYTES # BLD AUTO: 1.59 K/UL — SIGNIFICANT CHANGE UP (ref 1–3.3)
LYMPHOCYTES # BLD AUTO: 10.5 % — LOW (ref 13–44)
MAGNESIUM SERPL-MCNC: 2 MG/DL — SIGNIFICANT CHANGE UP (ref 1.6–2.6)
MCHC RBC-ENTMCNC: 29.5 PG — SIGNIFICANT CHANGE UP (ref 27–34)
MCHC RBC-ENTMCNC: 33.1 GM/DL — SIGNIFICANT CHANGE UP (ref 32–36)
MCV RBC AUTO: 89.1 FL — SIGNIFICANT CHANGE UP (ref 80–100)
MONOCYTES # BLD AUTO: 1.06 K/UL — HIGH (ref 0–0.9)
MONOCYTES NFR BLD AUTO: 7 % — SIGNIFICANT CHANGE UP (ref 2–14)
NEUTROPHILS # BLD AUTO: 12.47 K/UL — HIGH (ref 1.8–7.4)
NEUTROPHILS NFR BLD AUTO: 81.9 % — HIGH (ref 43–77)
NRBC # BLD: 0 /100 WBCS — SIGNIFICANT CHANGE UP (ref 0–0)
PHOSPHATE SERPL-MCNC: 3.3 MG/DL — SIGNIFICANT CHANGE UP (ref 2.5–4.5)
PLATELET # BLD AUTO: 245 K/UL — SIGNIFICANT CHANGE UP (ref 150–400)
POTASSIUM SERPL-MCNC: 4.2 MMOL/L — SIGNIFICANT CHANGE UP (ref 3.5–5.3)
POTASSIUM SERPL-SCNC: 4.2 MMOL/L — SIGNIFICANT CHANGE UP (ref 3.5–5.3)
RBC # BLD: 4.14 M/UL — SIGNIFICANT CHANGE UP (ref 3.8–5.2)
RBC # FLD: 12.9 % — SIGNIFICANT CHANGE UP (ref 10.3–14.5)
SODIUM SERPL-SCNC: 133 MMOL/L — LOW (ref 135–145)
WBC # BLD: 15.2 K/UL — HIGH (ref 3.8–10.5)
WBC # FLD AUTO: 15.2 K/UL — HIGH (ref 3.8–10.5)

## 2023-08-10 RX ORDER — KETOROLAC TROMETHAMINE 30 MG/ML
15 SYRINGE (ML) INJECTION EVERY 6 HOURS
Refills: 0 | Status: DISCONTINUED | OUTPATIENT
Start: 2023-08-10 | End: 2023-08-11

## 2023-08-10 RX ORDER — HEPARIN SODIUM 5000 [USP'U]/ML
7500 INJECTION INTRAVENOUS; SUBCUTANEOUS EVERY 8 HOURS
Refills: 0 | Status: DISCONTINUED | OUTPATIENT
Start: 2023-08-10 | End: 2023-08-10

## 2023-08-10 RX ORDER — HEPARIN SODIUM 5000 [USP'U]/ML
7500 INJECTION INTRAVENOUS; SUBCUTANEOUS EVERY 8 HOURS
Refills: 0 | Status: DISCONTINUED | OUTPATIENT
Start: 2023-08-10 | End: 2023-08-11

## 2023-08-10 RX ORDER — DEXAMETHASONE 0.5 MG/5ML
6 ELIXIR ORAL ONCE
Refills: 0 | Status: COMPLETED | OUTPATIENT
Start: 2023-08-10 | End: 2023-08-10

## 2023-08-10 RX ORDER — SODIUM CHLORIDE 9 MG/ML
1000 INJECTION INTRAMUSCULAR; INTRAVENOUS; SUBCUTANEOUS
Refills: 0 | Status: DISCONTINUED | OUTPATIENT
Start: 2023-08-10 | End: 2023-08-11

## 2023-08-10 RX ADMIN — Medication 15 MILLIGRAM(S): at 18:10

## 2023-08-10 RX ADMIN — Medication 650 MILLIGRAM(S): at 15:32

## 2023-08-10 RX ADMIN — ONDANSETRON 4 MILLIGRAM(S): 8 TABLET, FILM COATED ORAL at 10:22

## 2023-08-10 RX ADMIN — OXYCODONE HYDROCHLORIDE 5 MILLIGRAM(S): 5 TABLET ORAL at 12:05

## 2023-08-10 RX ADMIN — Medication 0.12 MILLIGRAM(S): at 18:10

## 2023-08-10 RX ADMIN — PANTOPRAZOLE SODIUM 40 MILLIGRAM(S): 20 TABLET, DELAYED RELEASE ORAL at 11:41

## 2023-08-10 RX ADMIN — Medication 105 MILLIGRAM(S): at 11:41

## 2023-08-10 RX ADMIN — Medication 0.12 MILLIGRAM(S): at 11:12

## 2023-08-10 RX ADMIN — SODIUM CHLORIDE 75 MILLILITER(S): 9 INJECTION INTRAMUSCULAR; INTRAVENOUS; SUBCUTANEOUS at 14:37

## 2023-08-10 RX ADMIN — OXYCODONE HYDROCHLORIDE 5 MILLIGRAM(S): 5 TABLET ORAL at 11:12

## 2023-08-10 RX ADMIN — Medication 650 MILLIGRAM(S): at 01:14

## 2023-08-10 RX ADMIN — Medication 0.12 MILLIGRAM(S): at 21:57

## 2023-08-10 RX ADMIN — HEPARIN SODIUM 7500 UNIT(S): 5000 INJECTION INTRAVENOUS; SUBCUTANEOUS at 14:45

## 2023-08-10 RX ADMIN — Medication 6 MILLIGRAM(S): at 12:43

## 2023-08-10 RX ADMIN — SODIUM CHLORIDE 150 MILLILITER(S): 9 INJECTION, SOLUTION INTRAVENOUS at 05:48

## 2023-08-10 RX ADMIN — Medication 15 MILLIGRAM(S): at 18:33

## 2023-08-10 RX ADMIN — HEPARIN SODIUM 7500 UNIT(S): 5000 INJECTION INTRAVENOUS; SUBCUTANEOUS at 21:57

## 2023-08-10 RX ADMIN — Medication 650 MILLIGRAM(S): at 06:32

## 2023-08-10 RX ADMIN — Medication 650 MILLIGRAM(S): at 00:40

## 2023-08-10 RX ADMIN — Medication 650 MILLIGRAM(S): at 19:51

## 2023-08-10 RX ADMIN — Medication 0.12 MILLIGRAM(S): at 03:47

## 2023-08-10 RX ADMIN — Medication 650 MILLIGRAM(S): at 06:33

## 2023-08-10 RX ADMIN — Medication 650 MILLIGRAM(S): at 14:45

## 2023-08-10 NOTE — DISCHARGE NOTE PROVIDER - NSDCCPTREATMENT_GEN_ALL_CORE_FT
PRINCIPAL PROCEDURE  Procedure: Robot-assisted laparoscopic sleeve gastrectomy  Findings and Treatment:       SECONDARY PROCEDURE  Procedure: Repair, hiatal hernia, robot-assisted  Findings and Treatment:

## 2023-08-10 NOTE — DISCHARGE NOTE PROVIDER - NSDCFUADDINST_GEN_ALL_CORE_FT
Follow up with Dr. Rasmussen in 1 week. Call the office at  to schedule your appointment. You may shower; soap and water over incision sites. Do not scrub. Pat dry when done. No tub bathing or swimming until cleared. Keep incision sites out of the sun as scars will darken. No heavy lifting (>10lbs) or strenuous exercise. Diet: Bariatric Full Fluids. 60 grams protein daily.  64 fluid ounces water daily. Drink small sips throughout the day. Continue diet as outlined by paperwork received as a pre-operative patient. You should be urinating at least 3-4x per day. Call the office if you experience increasing abdominal pain, nausea, vomiting, or temperature >100.4F.  NO ASPIRIN OR NSAIDs until approved by Dr. Rasmussen. Avoid alcoholic beverages until cleared by Dr. Rasmussen.      1) Please take Tylenol 650 mg every 4 to 6 hours by mouth for moderate pain control. Please do not exceed over 4,000 mg of Tylenol a day.  2) Please start taking Levsin .125 sublingual four times a day.  3) Please take Omeprazole 40 mg once a day by mouth.  4) You may take oxycodone 5mL every 6 hours as needed for severe pain  5) You may take zofran 4mg sublingual every 6 hours as needed for nausea   Follow up with Dr. Rasmussen in 1 week. Call the office at  to schedule your appointment. You may shower; soap and water over incision sites. Do not scrub. Pat dry when done. No tub bathing or swimming until cleared. Keep incision sites out of the sun as scars will darken. No heavy lifting (>10lbs) or strenuous exercise. Diet: Bariatric Full Fluids. 60 grams protein daily.  64 fluid ounces water daily. Drink small sips throughout the day. Continue diet as outlined by paperwork received as a pre-operative patient. You should be urinating at least 3-4x per day. Call the office if you experience increasing abdominal pain, nausea, vomiting, or temperature >100.4F.  NO ASPIRIN OR NSAIDs until approved by Dr. Rasmussen. Avoid alcoholic beverages until cleared by Dr. Rasmussen.      1) Please take Tylenol 650 mg every 4 to 6 hours by mouth for moderate pain control. Please do not exceed over 4,000 mg of Tylenol a day.  2) Please start taking Levsin .125 sublingual four times a day.  3) Please take Omeprazole 40 mg once a day by mouth.  4) Please take Aspirin 81mg chewable once a day for 30 days.  5) You may take oxycodone 5mL every 6 hours as needed for severe pain  6) You may take zofran 4mg sublingual every 6 hours as needed for nausea

## 2023-08-10 NOTE — PROGRESS NOTE ADULT - SUBJECTIVE AND OBJECTIVE BOX
INTERVAL HPI/OVERNIGHT EVENTS: tolerating minimal PO intake of clear liquid diet. Endorses nausea, no vomiting.    STATUS POST:  Robot-assisted laparoscopic sleeve gastrectomy, Repair, hiatal hernia, robot-assisted    POST OPERATIVE DAY #: 1    SUBJECTIVE:           Vital Signs Last 24 Hrs  T(C): 36.7 (09 Aug 2023 20:35), Max: 36.9 (09 Aug 2023 16:45)  T(F): 98.1 (09 Aug 2023 20:35), Max: 98.4 (09 Aug 2023 16:45)  HR: 76 (10 Aug 2023 04:30) (75 - 112)  BP: 128/73 (10 Aug 2023 04:30) (117/62 - 153/81)  BP(mean): 93 (10 Aug 2023 04:30) (82 - 110)  RR: 16 (10 Aug 2023 04:30) (15 - 20)  SpO2: 99% (10 Aug 2023 04:30) (97% - 100%)    Parameters below as of 10 Aug 2023 04:30  Patient On (Oxygen Delivery Method): room air      I&O's Detail    09 Aug 2023 07:01  -  10 Aug 2023 06:07  --------------------------------------------------------  IN:    Lactated Ringers: 1400 mL  Total IN: 1400 mL    OUT:    Voided (mL): 800 mL  Total OUT: 800 mL    Total NET: 600 mL          General: NAD, resting comfortably in bed  C/V: NSR  Pulm: Nonlabored breathing, no respiratory distress  Abd: soft, NT/ND.  Extrem: WWP, no edema, SCDs in place      LABS:                        12.9   13.86 )-----------( 269      ( 09 Aug 2023 16:32 )             38.3      INTERVAL HPI/OVERNIGHT EVENTS: tolerating minimal PO intake of clear liquid diet. Endorses nausea, no vomiting.    STATUS POST:  Robot-assisted laparoscopic sleeve gastrectomy, Repair, hiatal hernia, robot-assisted    POST OPERATIVE DAY #: 1    SUBJECTIVE: Patient endorses nausea but denies vomiting. She states that she has only had sips of clear liquids since her surgery. Her pain is well controlled.       Vital Signs Last 24 Hrs  T(C): 36.7 (09 Aug 2023 20:35), Max: 36.9 (09 Aug 2023 16:45)  T(F): 98.1 (09 Aug 2023 20:35), Max: 98.4 (09 Aug 2023 16:45)  HR: 76 (10 Aug 2023 04:30) (75 - 112)  BP: 128/73 (10 Aug 2023 04:30) (117/62 - 153/81)  BP(mean): 93 (10 Aug 2023 04:30) (82 - 110)  RR: 16 (10 Aug 2023 04:30) (15 - 20)  SpO2: 99% (10 Aug 2023 04:30) (97% - 100%)    Parameters below as of 10 Aug 2023 04:30  Patient On (Oxygen Delivery Method): room air      I&O's Detail    09 Aug 2023 07:01  -  10 Aug 2023 06:07  --------------------------------------------------------  IN:    Lactated Ringers: 1400 mL  Total IN: 1400 mL    OUT:    Voided (mL): 800 mL  Total OUT: 800 mL    Total NET: 600 mL      General: NAD, resting comfortably in bed  C/V: NSR  Pulm: Nonlabored breathing, no respiratory distress  Abd: mild ttp in epigastric region, nondistended, soft.  Extrem: WWP, no edema, SCDs in place      LABS:                        12.9   13.86 )-----------( 269      ( 09 Aug 2023 16:32 )             38.3

## 2023-08-10 NOTE — DIETITIAN NUTRITION RISK NOTIFICATION - ADDITIONAL COMMENTS/DIETITIAN RECOMMENDATIONS
**See Dietitian Initial Evaluation 8/10/23    Recommendations:  - Continue BARICLLIQ diet    > Recommend advance to Phase 1 Bariatric Full Liquid Diet when medically feasible   - Encourage adequate hydration with goal of 4oz/hr and/or 64 oz/day 4  - Monitor BMP, BG, POCT, lytes, replete prn   - Monitor wt trends, GI function, and skin integrity   - RD to remain available for additional nutrition interventions and diet edu as needed  **Moderate Nutrition Risk

## 2023-08-10 NOTE — DISCHARGE NOTE PROVIDER - CARE PROVIDER_API CALL
Reyna Rasmussen  Surgery  [address], [address]  [place] [address]  Phone: [phone]  Fax: [phone]  Follow Up Time: 1 week

## 2023-08-10 NOTE — DISCHARGE NOTE PROVIDER - HOSPITAL COURSE
22F with pmhx of morbid obesity BMI 41, anxiety and depression, mild SHILOH (not on CPAP), presented on day of admission for elective VSG and hiatal hernia repair. EGD on 4/23 showed 2 cm hiatal hernia, path wnl.  Post operatively patient was admitted for further management and monitoring. Her postoperative course was unremarkable with advancement of diet, passing trial of void, and pain control. On day of discharge patient was stable to be d/c'd home.    1) Please take Tylenol 650 mg every 4 to 6 hours by mouth for moderate pain control. Please do not exceed over 4,000 mg of Tylenol a day.  2) Please start taking Levsin .125 sublingual four times a day.  3) Please take Omeprazole 40 mg once a day by mouth.  4) You may take oxycodone 5mL every 6 hours as needed for severe pain  5) You may take zofran 4mg sublingual every 6 hours as needed for nausea   22F with pmhx of morbid obesity BMI 41, anxiety and depression, mild SHILOH (not on CPAP), presented on day of admission for elective VSG and hiatal hernia repair. EGD on 4/23 showed 2 cm hiatal hernia, path wnl.  Post operatively patient was admitted for further management and monitoring. Her postoperative course was unremarkable with advancement of diet, passing trial of void, and pain control. On day of discharge patient was stable to be d/c'd home.    1) Please take Tylenol 650 mg every 4 to 6 hours by mouth for moderate pain control. Please do not exceed over 4,000 mg of Tylenol a day.  2) Please start taking Levsin .125 sublingual four times a day.  3) Please take Omeprazole 40 mg once a day by mouth.  4) Please take Aspirin 81mg chewable once a day for 30 days.  5) You may take oxycodone 5mL every 6 hours as needed for severe pain  6) You may take zofran 4mg sublingual every 6 hours as needed for nausea

## 2023-08-10 NOTE — DIETITIAN INITIAL EVALUATION ADULT - OTHER INFO
22F with pmhx of morbid obesity BMI 41, anxiety and depression, mild SHILOH (not on CPAP), 2cm hiatal hernia on EGD, no sign pshx now s/p RA lap VSG and HH repair.    Pt seen by RDN on 9WO for nutrition assessment and education. On assessment, pt resting in bed. Currently on BARICLLIQ diet. NKFA. No cultural, Quaker, or other dietary preferences expressed. Pt denies difficulty chewing/swallowing. Pt reported she was unable to have breakfast this morning d/t nausea. PTA pt reported good appetite and intake. Pt endorses UBW ~244lb and denies any recent wt changes. Pt's admission/dosing 109.3kg/240lb consistent with reported UBW. RDN provided in-dept education on diet advancement and specific nutrition needs s/p VSG and encouraged pt to aim for 4oz/hr of liquids, as tolerated.     GI: Reported nausea. Denies vomiting/diarrhea/constipation.  Skin: Surgical incision, abdomen. No edema or pressure injuries documented at this time. Darrell 20.  Pain: Pt reported moderate pain to RN during RD interview

## 2023-08-10 NOTE — DISCHARGE NOTE PROVIDER - DETAILS OF MALNUTRITION DIAGNOSIS/DIAGNOSES
This patient has been assessed with a concern for Malnutrition and was treated during this hospitalization for the following Nutrition diagnosis/diagnoses:     -  08/10/2023: Morbid obesity (BMI > 40)

## 2023-08-10 NOTE — DISCHARGE NOTE PROVIDER - NSDCFUSCHEDAPPT_GEN_ALL_CORE_FT
Reyna Rasmussen  [facility]  [facility] [address]  Scheduled Appointment: 08/23/2023    Ashleigh Ashraf  [facility]  [facility] [address]  Scheduled Appointment: 09/08/2023

## 2023-08-10 NOTE — DIETITIAN INITIAL EVALUATION ADULT - PERTINENT MEDS FT
MEDICATIONS  (STANDING):  acetaminophen   Oral Liquid .. 650 milliGRAM(s) Oral every 6 hours  hyoscyamine SL 0.125 milliGRAM(s) SubLingual every 6 hours  lactated ringers. 1000 milliLiter(s) (75 mL/Hr) IV Continuous <Continuous>  pantoprazole  Injectable 40 milliGRAM(s) IV Push daily  thiamine IVPB 500 milliGRAM(s) IV Intermittent every 24 hours    MEDICATIONS  (PRN):  ondansetron Injectable 4 milliGRAM(s) IV Push every 6 hours PRN Nausea and/or Vomiting  oxyCODONE    Solution 5 milliGRAM(s) Oral every 6 hours PRN Severe Pain (7 - 10)

## 2023-08-10 NOTE — DIETITIAN INITIAL EVALUATION ADULT - ADD RECOMMEND
- Continue BARICLLIQ diet    > Recommend advance to Phase 1 Bariatric Full Liquid Diet when medically feasible   - Encourage adequate hydration with goal of 4oz/hr and/or 64 oz/day 4  - Monitor BMP, BG, POCT, lytes, replete prn   - Monitor wt trends, GI function, and skin integrity   - RD to remain available for additional nutrition interventions and diet edu as needed  **Moderate Nutrition Risk

## 2023-08-10 NOTE — DIETITIAN INITIAL EVALUATION ADULT - ETIOLOGY
R/T need for educational review on the diet advancement process and specific nutrient needs s/p VSG

## 2023-08-10 NOTE — DIETITIAN INITIAL EVALUATION ADULT - OTHER CALCULATIONS
Ht: 5'2", CBW: 109.3kg/240lb, IBW 50.0kg/110lb +/-10%, %% . IBW used to calculate estimated needs based on Standards of Care given pt exceeds 120% IBW. Adjusted for post-bariatric surgery:    Ht: 5'2", CBW: 109.3kg/240lb, IBW 50.0kg/110lb +/-10%, %% . IBW used to calculate estimated needs based on Standards of Care given pt exceeds 120% IBW. Adjusted for post-bariatric surgery:   - Wks 1-2 est needs: 500-600kcal/day (10-12kcal/kg IBW), 60-80gm pro/day, 1500mL/day (30mL/kg IBW).   - Wk 3 advance to wt maint: 1000-1250kcal/day (20-25kcal/kg IBW), 60-75g pro/day (1.2-1.5g/kg IBW), >/=64oz clear fluids.

## 2023-08-10 NOTE — DISCHARGE NOTE PROVIDER - NSDCMRMEDTOKEN_GEN_ALL_CORE_FT
acetaminophen 160 mg/5 mL oral liquid: 20 milliliter(s) orally every 6 hours as needed for  moderate pain MDD: 80 mL  aspirin 81 mg oral tablet, chewable: 1 tab(s) chewed once a day MDD: 1 tablet  Levsin SL 0.125 mg sublingual tablet: 1 tab(s) sublingually 4 times a day  omeprazole 40 mg oral delayed release capsule: 1 cap(s) orally once a day MDD: 1 tablet  ondansetron 4 mg oral tablet: 1 tab(s) orally every 6 hours as needed for  nausea MDD: 4 tablets  oxyCODONE 5 mg/5 mL oral solution: 5 milliliter(s) orally every 6 hours as needed for  severe pain MDD: 20mL

## 2023-08-10 NOTE — DIETITIAN INITIAL EVALUATION ADULT - PERTINENT LABORATORY DATA
08-10    133<L>  |  102  |  12  ----------------------------<  104<H>  4.2   |  22  |  0.85    Ca    8.6      10 Aug 2023 05:30  Phos  3.3     08-10  Mg     2.0     08-10

## 2023-08-11 ENCOUNTER — TRANSCRIPTION ENCOUNTER (OUTPATIENT)
Age: 23
End: 2023-08-11

## 2023-08-11 VITALS
SYSTOLIC BLOOD PRESSURE: 124 MMHG | OXYGEN SATURATION: 98 % | RESPIRATION RATE: 17 BRPM | DIASTOLIC BLOOD PRESSURE: 78 MMHG | HEART RATE: 82 BPM | TEMPERATURE: 98 F

## 2023-08-11 PROCEDURE — 88302 TISSUE EXAM BY PATHOLOGIST: CPT

## 2023-08-11 PROCEDURE — C1889: CPT

## 2023-08-11 PROCEDURE — 80048 BASIC METABOLIC PNL TOTAL CA: CPT

## 2023-08-11 PROCEDURE — 88307 TISSUE EXAM BY PATHOLOGIST: CPT

## 2023-08-11 PROCEDURE — 36415 COLL VENOUS BLD VENIPUNCTURE: CPT

## 2023-08-11 PROCEDURE — 85025 COMPLETE CBC W/AUTO DIFF WBC: CPT

## 2023-08-11 PROCEDURE — 83735 ASSAY OF MAGNESIUM: CPT

## 2023-08-11 PROCEDURE — 84100 ASSAY OF PHOSPHORUS: CPT

## 2023-08-11 PROCEDURE — 85027 COMPLETE CBC AUTOMATED: CPT

## 2023-08-11 PROCEDURE — 82962 GLUCOSE BLOOD TEST: CPT

## 2023-08-11 PROCEDURE — S2900: CPT

## 2023-08-11 RX ORDER — OXYCODONE HYDROCHLORIDE 5 MG/1
5 TABLET ORAL
Qty: 80 | Refills: 0
Start: 2023-08-11 | End: 2023-08-14

## 2023-08-11 RX ORDER — ASPIRIN/CALCIUM CARB/MAGNESIUM 324 MG
1 TABLET ORAL
Qty: 30 | Refills: 0
Start: 2023-08-11 | End: 2023-09-09

## 2023-08-11 RX ORDER — HYOSCYAMINE SULFATE 0.13 MG
1 TABLET ORAL
Qty: 28 | Refills: 0
Start: 2023-08-11 | End: 2023-08-17

## 2023-08-11 RX ORDER — ACETAMINOPHEN 500 MG
20 TABLET ORAL
Qty: 320 | Refills: 0
Start: 2023-08-11 | End: 2023-08-14

## 2023-08-11 RX ORDER — ONDANSETRON 8 MG/1
1 TABLET, FILM COATED ORAL
Qty: 12 | Refills: 0
Start: 2023-08-11 | End: 2023-08-13

## 2023-08-11 RX ORDER — OMEPRAZOLE 10 MG/1
1 CAPSULE, DELAYED RELEASE ORAL
Qty: 30 | Refills: 0
Start: 2023-08-11 | End: 2023-09-09

## 2023-08-11 RX ADMIN — Medication 650 MILLIGRAM(S): at 06:32

## 2023-08-11 RX ADMIN — SCOPALAMINE 1 PATCH: 1 PATCH, EXTENDED RELEASE TRANSDERMAL at 06:14

## 2023-08-11 RX ADMIN — Medication 0.12 MILLIGRAM(S): at 05:34

## 2023-08-11 RX ADMIN — Medication 650 MILLIGRAM(S): at 11:32

## 2023-08-11 RX ADMIN — SODIUM CHLORIDE 75 MILLILITER(S): 9 INJECTION INTRAMUSCULAR; INTRAVENOUS; SUBCUTANEOUS at 03:36

## 2023-08-11 RX ADMIN — HEPARIN SODIUM 7500 UNIT(S): 5000 INJECTION INTRAVENOUS; SUBCUTANEOUS at 05:34

## 2023-08-11 RX ADMIN — Medication 15 MILLIGRAM(S): at 11:32

## 2023-08-11 RX ADMIN — HEPARIN SODIUM 7500 UNIT(S): 5000 INJECTION INTRAVENOUS; SUBCUTANEOUS at 13:53

## 2023-08-11 RX ADMIN — Medication 15 MILLIGRAM(S): at 05:35

## 2023-08-11 RX ADMIN — PANTOPRAZOLE SODIUM 40 MILLIGRAM(S): 20 TABLET, DELAYED RELEASE ORAL at 11:33

## 2023-08-11 RX ADMIN — Medication 0.12 MILLIGRAM(S): at 11:32

## 2023-08-11 RX ADMIN — Medication 15 MILLIGRAM(S): at 00:09

## 2023-08-11 NOTE — PROGRESS NOTE ADULT - ASSESSMENT
22F with pmhx of morbid obesity BMI 41, anxiety and depression, mild SHILOH (not on CPAP), 2cm hiatal hernia on EGD, no sign pshx now s/p RA lap VSG and HH repair    BCLD/IVF  Pain/nausea control  Levsin q6hrs  Protonix  OOBA/SCD/IS  AM labs  HSQ and toradol after am CBC  Nutrition consult in AM
22F with pmhx of morbid obesity BMI 41, anxiety and depression, mild SHILOH (not on CPAP), 2cm hiatal hernia on EGD, no sign pshx now s/p RA lap VSG and HH repair (8/9).    BCLD/IVF  Pain/nausea control  Levsin q6hrs  Protonix  OOBA/SCD/IS

## 2023-08-11 NOTE — DISCHARGE NOTE NURSING/CASE MANAGEMENT/SOCIAL WORK - PATIENT PORTAL LINK FT
You can access the FollowMyHealth Patient Portal offered by [facility] by registering at the following website: [email or web address]. By joining [employer]’s FollowMyHealth portal, you will also be able to view your health information using other applications (apps) compatible with our system.

## 2023-08-11 NOTE — DISCHARGE NOTE NURSING/CASE MANAGEMENT/SOCIAL WORK - NSDCPEFALRISK_GEN_ALL_CORE
For information on Fall & Injury Prevention, visit: [email or web address] OR  [email or web address] OR  https://www.cdc.gov/steadi/patient.html

## 2023-08-11 NOTE — PROGRESS NOTE ADULT - SUBJECTIVE AND OBJECTIVE BOX
INTERVAL HPI/OVERNIGHT EVENTS: tolerating her clear liquid diet, nausea improving, no vomiting. Patient endorses ambulation.    STATUS POST:  Robot-assisted laparoscopic sleeve gastrectomy, Repair, hiatal hernia, robot-assisted    POST OPERATIVE DAY #: 1    SUBJECTIVE: Patient seen and examined at bedside with chief resident. Patient states that she is tolerating her clear liquid diet, denies nausea and vomiting. She states that her pain is well controlled. She endorses ambulating and use of her incentive spirometer.      heparin   Injectable 7500 Unit(s) SubCutaneous every 8 hours      Vital Signs Last 24 Hrs  T(C): 36.8 (11 Aug 2023 05:21), Max: 37.3 (10 Aug 2023 16:35)  T(F): 98.3 (11 Aug 2023 05:21), Max: 99.2 (10 Aug 2023 16:35)  HR: 80 (11 Aug 2023 05:21) (66 - 83)  BP: 129/55 (11 Aug 2023 05:21) (122/58 - 140/75)  BP(mean): 101 (10 Aug 2023 12:00) (83 - 101)  RR: 16 (11 Aug 2023 05:21) (14 - 18)  SpO2: 96% (11 Aug 2023 05:21) (96% - 100%)    Parameters below as of 11 Aug 2023 05:21  Patient On (Oxygen Delivery Method): room air      I&O's Detail    10 Aug 2023 07:01  -  11 Aug 2023 07:00  --------------------------------------------------------  IN:    IV PiggyBack: 100 mL    Lactated Ringers: 300 mL    sodium chloride 0.9%: 1350 mL  Total IN: 1750 mL    OUT:    Voided (mL): 1650 mL  Total OUT: 1650 mL    Total NET: 100 mL          General: NAD, resting comfortably in bed  C/V: NSR  Pulm: Nonlabored breathing, no respiratory distress  Abd: soft, NT/ND.  Extrem: WWP, no edema, SCDs in place  Drains:  Ortega:      LABS:                        12.2   15.20 )-----------( 245      ( 10 Aug 2023 05:30 )             36.9     08-10    133<L>  |  102  |  12  ----------------------------<  104<H>  4.2   |  22  |  0.85    Ca    8.6      10 Aug 2023 05:30  Phos  3.3     08-10  Mg     2.0     08-10        Urinalysis Basic - ( 10 Aug 2023 05:30 )    Color: x / Appearance: x / SG: x / pH: x  Gluc: 104 mg/dL / Ketone: x  / Bili: x / Urobili: x   Blood: x / Protein: x / Nitrite: x   Leuk Esterase: x / RBC: x / WBC x   Sq Epi: x / Non Sq Epi: x / Bacteria: x   INTERVAL HPI/OVERNIGHT EVENTS: tolerating her clear liquid diet, nausea improving, no vomiting. Patient endorses ambulation.    STATUS POST:  Robot-assisted laparoscopic sleeve gastrectomy, Repair, hiatal hernia, robot-assisted    POST OPERATIVE DAY #: 1    SUBJECTIVE: Patient seen and examined at bedside with chief resident. Patient states that she is tolerating her clear liquid diet, denies nausea and vomiting. She states that her pain is well controlled. She endorses ambulating and use of her incentive spirometer.      heparin   Injectable 7500 Unit(s) SubCutaneous every 8 hours      Vital Signs Last 24 Hrs  T(C): 36.8 (11 Aug 2023 05:21), Max: 37.3 (10 Aug 2023 16:35)  T(F): 98.3 (11 Aug 2023 05:21), Max: 99.2 (10 Aug 2023 16:35)  HR: 80 (11 Aug 2023 05:21) (66 - 83)  BP: 129/55 (11 Aug 2023 05:21) (122/58 - 140/75)  BP(mean): 101 (10 Aug 2023 12:00) (83 - 101)  RR: 16 (11 Aug 2023 05:21) (14 - 18)  SpO2: 96% (11 Aug 2023 05:21) (96% - 100%)    Parameters below as of 11 Aug 2023 05:21  Patient On (Oxygen Delivery Method): room air      I&O's Detail    10 Aug 2023 07:01  -  11 Aug 2023 07:00  --------------------------------------------------------  IN:    IV PiggyBack: 100 mL    Lactated Ringers: 300 mL    sodium chloride 0.9%: 1350 mL  Total IN: 1750 mL    OUT:    Voided (mL): 1650 mL  Total OUT: 1650 mL    Total NET: 100 mL      General: NAD, resting comfortably in bed  C/V: NSR  Pulm: Nonlabored breathing, no respiratory distress  Abd: mild ttp in the epigastric region, nondistended, soft.  Extrem: WWP, no edema, SCDs in place      LABS:                        12.2   15.20 )-----------( 245      ( 10 Aug 2023 05:30 )             36.9     08-10    133<L>  |  102  |  12  ----------------------------<  104<H>  4.2   |  22  |  0.85    Ca    8.6      10 Aug 2023 05:30  Phos  3.3     08-10  Mg     2.0     08-10        Urinalysis Basic - ( 10 Aug 2023 05:30 )    Color: x / Appearance: x / SG: x / pH: x  Gluc: 104 mg/dL / Ketone: x  / Bili: x / Urobili: x   Blood: x / Protein: x / Nitrite: x   Leuk Esterase: x / RBC: x / WBC x   Sq Epi: x / Non Sq Epi: x / Bacteria: x

## 2023-08-11 NOTE — PROGRESS NOTE ADULT - NUTRITIONAL ASSESSMENT
This patient has been assessed with a concern for Malnutrition and has been determined to have a diagnosis/diagnoses of Morbid obesity (BMI > 40).    This patient is being managed with:   Diet Clear Liquid-  Bariatric Clear Liquid (BARICLLIQ)  Entered: Aug  9 2023 11:20AM

## 2023-08-15 DIAGNOSIS — Z88.0 ALLERGY STATUS TO PENICILLIN: ICD-10-CM

## 2023-08-15 DIAGNOSIS — G47.33 OBSTRUCTIVE SLEEP APNEA (ADULT) (PEDIATRIC): ICD-10-CM

## 2023-08-15 DIAGNOSIS — K44.9 DIAPHRAGMATIC HERNIA WITHOUT OBSTRUCTION OR GANGRENE: ICD-10-CM

## 2023-08-15 DIAGNOSIS — E66.01 MORBID (SEVERE) OBESITY DUE TO EXCESS CALORIES: ICD-10-CM

## 2023-08-15 DIAGNOSIS — F41.9 ANXIETY DISORDER, UNSPECIFIED: ICD-10-CM

## 2023-08-15 DIAGNOSIS — F32.A DEPRESSION, UNSPECIFIED: ICD-10-CM

## 2023-08-15 NOTE — ASSESSMENT
[FreeTextEntry1] : Pt is a 23 y/o F with pmhx of morbid obesity BMI 41, anxiety and depression, who presents today feeling well here for final visit for bariatric sx. Pt states she has completed all of her required weigh ins and evaluations which were reviewed today. Pt was evaluated by pulmonary and underwent sleep study revealing mild SHILOH and was recommended wt loss and to f/u prn. EGD revealed a 2cm HH, path wnl. Pt is interested in the VSG and understands there is a 30% risk of developing GERD after the VSG. All of the risks, benefits, and alternatives to the proposed procedure were explained to the pt and she wishes to proceed with scheduling VSG and HH repair.

## 2023-08-15 NOTE — ASSESSMENT
[FreeTextEntry1] : Pt is a 21 y/o F with pmhx of morbid obesity BMI 41, anxiety and depression, who presents today feeling well here for final visit for bariatric sx. Pt states she has completed all of her required weigh ins and evaluations which were reviewed today. Pt was evaluated by pulmonary and underwent sleep study revealing mild SHILOH and was recommended wt loss and to f/u prn. EGD revealed a 2cm HH, path wnl. Pt is interested in the VSG and understands there is a 30% risk of developing GERD after the VSG. All of the risks, benefits, and alternatives to the proposed procedure were explained to the pt and she wishes to proceed with scheduling VSG and HH repair.

## 2023-08-15 NOTE — PLAN
Pt placed in observation for evaluation of acute encephalopathy vs delirium with recent catatonia and concern for psychosomatic component.  No infectious process identified, previous admissions pt presented with UTIs which was not evident this admission.  Neurology and Psychiatry consulted.  Neurology recommended repeat EEG which showed no seizure activity.  Recommend re-establishing with Rheumatology to restart Rituxan infusions.  Psychiatry did not recommend valium taper at this time as this could exacerbate delirium, indicated if catatonia re-emerges.  Follow up PCP, Neurology, rheumatology.       Psych follow up 1-2 weeks from discharge with close monitoring.   [FreeTextEntry1] : schedule VSG and  repair

## 2023-08-15 NOTE — ADDENDUM
[FreeTextEntry1] : It was my pleasure to interact with Ms. SOUTH WINSTON today. In summary, Ms. SOUTH WINSTON has morbid obesity refractory to medical and dietary efforts for which She could benefit from weight loss surgery.  We discussed in detail the sleeve gastrectomy. She understood that these procedures are done primarily minimally invasively (laparoscopically or robotically), but that there is a possibility of conversion to laparotomy. In this particular case, with their body mass index we discussed realistic expectations with respect to weight loss.  Approximately 50% of excess weight will be lost if She has sleeve gastrectomy.  In order to maintain weight loss or lose more weight, She will be required to modify diet and exercise habits (the "tool" concept of weight loss surgery).  We discussed the necessity for continuous, life-long medical care following surgery and the necessity for taking mineral and vitamin supplements daily for the rest of life. We discussed the importance of high protein diet and daily exercise for maximal success.\par \par We discussed complications that may follow bariatric surgery that include, but are not limited to, respiratory problems, pneumonia, thrombophlebitis, and pulmonary embolus.  We also discussed intra-abdominal complications including anastomotic leak, intra-abdominal infections, portal vein thrombosis and death that may result from bariatric surgery.  We discussed that there may be other complications related to a specific type of surgery. With respect to sleeve gastrectomy we discussed the chances of having refractory GERD that may require intervention in the future including conversion to gastric bypass. We also discussed postoperative DVT prophylaxis to decrease the incidence of deep vein thrombosis when indicated. \par \par I specifically addressed the patient regarding pregnancy.  Weight loss surgery patients should not become pregnant in the first two years following surgery because of the high risk of fetal malformation and miscarriage.\par \par The prolonged discussion (greater than 45 minutes) centered primarily on the indications for surgery and evaluation of the risk/benefit ratio for Ms. SOUTH WINSTON and other weight loss alternatives. I answered all questions about bariatric surgery and possible complications to the best of my ability.\par \par Once the preoperative evaluation is complete, I will review the chart and schedule the patient for a follow-up visit in order to answer any questions prior to scheduling surgery.\par \par Plan:\par Preoperative findings reviewed\par Will ensure appropriate pre-operative risk assessments and clearances\par Plan for robotic assisted sleeve gastrectomy with paraesophageal/hiatal hernia repair\par \par \par I, Dr. Reyna Rasmussen, spent 35 minutes with the patient >50% counseling/coordination of care including, reviewing the patient's history, performing an examination, reviewing relevant labs and radiographic imaging, reviewing PCP and consultant notes, discussion of medical and surgical management of the diagnosis as well as associated risks and benefits, and completing documentation.\par

## 2023-08-15 NOTE — HISTORY OF PRESENT ILLNESS
[de-identified] : Pt is a 21 y/o F with pmhx of morbid obesity BMI 41, anxiety and depression, who presents today feeling well here for final visit for bariatric sx. Pt states she has completed all of her required weigh ins and evaluations which were reviewed today. Pt was evaluated by pulmonary and underwent sleep study revealing mild SHILOH and was recommended wt loss and to f/u prn. EGD revealed a 2cm HH, path wnl. Pt is interested in the VSG and understands there is a 30% risk of developing GERD after the VSG. All of the risks, benefits, and alternatives to the proposed procedure were explained to the pt and she wishes to proceed with scheduling VSG and HH repair.

## 2023-08-15 NOTE — HISTORY OF PRESENT ILLNESS
[de-identified] : Pt is a 21 y/o F with pmhx of morbid obesity BMI 41, anxiety and depression, who presents today feeling well here for final visit for bariatric sx. Pt states she has completed all of her required weigh ins and evaluations which were reviewed today. Pt was evaluated by pulmonary and underwent sleep study revealing mild SHILOH and was recommended wt loss and to f/u prn. EGD revealed a 2cm HH, path wnl. Pt is interested in the VSG and understands there is a 30% risk of developing GERD after the VSG. All of the risks, benefits, and alternatives to the proposed procedure were explained to the pt and she wishes to proceed with scheduling VSG and HH repair.

## 2023-08-23 LAB — SURGICAL PATHOLOGY STUDY: SIGNIFICANT CHANGE UP

## 2023-08-28 ENCOUNTER — APPOINTMENT (OUTPATIENT)
Dept: BARIATRICS | Facility: CLINIC | Age: 23
End: 2023-08-28
Payer: MEDICAID

## 2023-08-28 VITALS
WEIGHT: 228.56 LBS | BODY MASS INDEX: 39.02 KG/M2 | OXYGEN SATURATION: 98 % | SYSTOLIC BLOOD PRESSURE: 117 MMHG | HEIGHT: 64 IN | TEMPERATURE: 98.1 F | DIASTOLIC BLOOD PRESSURE: 79 MMHG | HEART RATE: 107 BPM

## 2023-08-28 VITALS — HEART RATE: 88 BPM

## 2023-08-28 PROBLEM — E66.01 MORBID (SEVERE) OBESITY DUE TO EXCESS CALORIES: Chronic | Status: ACTIVE | Noted: 2023-08-08

## 2023-08-28 PROBLEM — Z86.59 PERSONAL HISTORY OF OTHER MENTAL AND BEHAVIORAL DISORDERS: Chronic | Status: ACTIVE | Noted: 2023-08-08

## 2023-08-28 PROBLEM — G47.33 OBSTRUCTIVE SLEEP APNEA (ADULT) (PEDIATRIC): Chronic | Status: ACTIVE | Noted: 2023-08-08

## 2023-08-28 PROBLEM — K44.9 DIAPHRAGMATIC HERNIA WITHOUT OBSTRUCTION OR GANGRENE: Chronic | Status: ACTIVE | Noted: 2023-08-08

## 2023-08-28 PROCEDURE — 99024 POSTOP FOLLOW-UP VISIT: CPT

## 2023-08-28 NOTE — ADDENDUM
[FreeTextEntry1] : I, Dr. Reyna Rasmussen, spent 25 minutes with the patient >50% counseling/coordination of care including, reviewing the patient's history, performing an examination, reviewing relevant labs and radiographic imaging, reviewing PCP and consultant notes, discussion of medical and surgical management of the diagnosis as well as associated risks and benefits, and completing documentation.

## 2023-08-28 NOTE — ASSESSMENT
[FreeTextEntry1] : Pt is a 24 y/o F 3 weeks s/p VSG and HH repair who presents today feeling well here for post op care. Pt denies any fevers, chest pn, sob, calf pain, vomiting, abd pn. Pt reports nausea when drinking water and the protein drinks. Admits to drinking cranberry juice. Recommended pt to add crystal light to water, diet snapple or gatorade, and blending protein smoothies with powdered protein. Counseled pt at length about the importance of avoiding sugary drinks which she understands. Pt is consuming about 20oz of fluids daily and has been having yogurt and soups for protein intake. Discussed intake requirements including having 64oz of fluids daily and 60g of protein daily. States she has been walking for exercise and has lost 16 lbs since sx. Pt states she is moving her bowels twice per week and denies any reflux. Pt had POA last week which she canceled. Tachy to 107 today, responded to 8oz of fluids in office, HR recheck 88.  Pt to meet with nutrition today.

## 2023-08-28 NOTE — HISTORY OF PRESENT ILLNESS
[de-identified] : Pt is a 24 y/o F 3 weeks s/p VSG and HH repair who presents today feeling well here for post op care. Pt denies any fevers, chest pn, sob, calf pain, vomiting, abd pn. Pt reports nausea when drinking water and the protein drinks. Admits to drinking cranberry juice. Recommended pt to add crystal light to water, diet snapple or gatorade, and blending protein smoothies with powdered protein. Counseled pt at length about the importance of avoiding sugary drinks which she understands. Pt is consuming about 20oz of fluids daily and has been having yogurt and soups for protein intake. Discussed intake requirements including having 64oz of fluids daily and 60g of protein daily. States she has been walking for exercise and has lost 16 lbs since sx. Pt states she is moving her bowels twice per week and denies any reflux. Pt had POA last week which she canceled. Tachy to 107 today, responded to 8oz of fluids in office, HR recheck 88.  Pt to meet with nutrition today.

## 2023-09-06 ENCOUNTER — APPOINTMENT (OUTPATIENT)
Dept: BARIATRICS | Facility: CLINIC | Age: 23
End: 2023-09-06
Payer: MEDICAID

## 2023-09-06 VITALS
DIASTOLIC BLOOD PRESSURE: 82 MMHG | HEART RATE: 103 BPM | BODY MASS INDEX: 37.97 KG/M2 | OXYGEN SATURATION: 98 % | HEIGHT: 64 IN | SYSTOLIC BLOOD PRESSURE: 125 MMHG | TEMPERATURE: 98.4 F | WEIGHT: 222.38 LBS

## 2023-09-06 VITALS — HEART RATE: 92 BPM

## 2023-09-06 PROCEDURE — 99024 POSTOP FOLLOW-UP VISIT: CPT

## 2023-09-06 NOTE — ASSESSMENT
[FreeTextEntry1] : Pt is a 22 y/o F 1 mo s/p VSG and HH repair who presents today for routine f/u feeling well. Pt states she has still been struggling meeting her daily protein and fluid intake goals, reports having about 2 oz of fluids daily and eating 1-2x/day. Reports not tolerating the protein shakes and has not been consuming any protein supplements. Pt states she is compliant with her daily vit and denies any n,v,c,d, reflux, or abd pn. States she is walking daily for exercise and has lost 22 lbs since sx. Counseled pt at length about foods and snacks that contain protein and reviewed our eating recommendations, q2-3 hrs. Pt understands daily protein intake goal is 60g and daily fluid intake is 64 oz of fluids daily. Pt to f/u in 1 week and will meet with nutrition today.

## 2023-09-06 NOTE — HISTORY OF PRESENT ILLNESS
[de-identified] : Pt is a 22 y/o F 1 mo s/p VSG and HH repair who presents today for routine f/u feeling well. Pt states she has still been struggling meeting her daily protein and fluid intake goals, reports having about 2 oz of fluids daily and eating 1-2x/day. Reports not tolerating the protein shakes and has not been consuming any protein supplements. Pt states she is compliant with her daily vit and denies any n,v,c,d, reflux, or abd pn. States she is walking daily for exercise and has lost 22 lbs since sx. Counseled pt at length about foods and snacks that contain protein and reviewed our eating recommendations, q2-3 hrs. Pt understands daily protein intake goal is 60g and daily fluid intake is 64 oz of fluids daily. Pt to f/u in 1 week and will meet with nutrition today.

## 2023-09-25 ENCOUNTER — APPOINTMENT (OUTPATIENT)
Dept: BARIATRICS | Facility: CLINIC | Age: 23
End: 2023-09-25
Payer: MEDICAID

## 2023-09-25 VITALS
OXYGEN SATURATION: 98 % | BODY MASS INDEX: 36.54 KG/M2 | HEIGHT: 64 IN | TEMPERATURE: 97.6 F | HEART RATE: 103 BPM | SYSTOLIC BLOOD PRESSURE: 110 MMHG | DIASTOLIC BLOOD PRESSURE: 72 MMHG | WEIGHT: 214 LBS

## 2023-09-25 DIAGNOSIS — Z98.84 BARIATRIC SURGERY STATUS: ICD-10-CM

## 2023-09-25 PROCEDURE — 99024 POSTOP FOLLOW-UP VISIT: CPT

## 2023-10-11 ENCOUNTER — APPOINTMENT (OUTPATIENT)
Dept: OBGYN | Facility: CLINIC | Age: 23
End: 2023-10-11

## 2023-11-07 NOTE — PATIENT PROFILE ADULT - DO YOU FEEL UNSAFE AT HOME, WORK, OR SCHOOL?
[id number] made dr Raquel Kang aware bladder scan 223, small urine incontinent brief today, no new orders
no

## 2023-11-20 ENCOUNTER — APPOINTMENT (OUTPATIENT)
Dept: BARIATRICS | Facility: CLINIC | Age: 23
End: 2023-11-20

## (undated) DEVICE — SUT VLOC 180 3-0 6" V-20 GREEN

## (undated) DEVICE — DRSG DERMABOND 0.7ML

## (undated) DEVICE — SUT VICRYL 0 54" TIES

## (undated) DEVICE — SYR LUER LOK 30CC

## (undated) DEVICE — POSITIONER FOAM EGG CRATE ULNAR 2PCS (PINK)

## (undated) DEVICE — DRAPE 1/2 SHEET 40X57"

## (undated) DEVICE — XI ENDOWRIST 12 - 8 MM CANNULA REDUCER

## (undated) DEVICE — TUBING STRYKER PNEUMOSURE HI FLOW INSUFFLATOR

## (undated) DEVICE — Device

## (undated) DEVICE — ENDOCATCH 10MM SPECIMEN POUCH

## (undated) DEVICE — XI TIP COVER

## (undated) DEVICE — SUT MONOCRYL 4-0 18" PS-2

## (undated) DEVICE — TUBING IV EXTENSION 30"

## (undated) DEVICE — XI 12MM AND STAPLER CANNULA SEAL

## (undated) DEVICE — XI STAPLER SUREFORM 60

## (undated) DEVICE — XI DRAPE COLUMN

## (undated) DEVICE — TROCAR COVIDIEN VERSAPORT BLADELESS OPTICAL 5MM STANDARD

## (undated) DEVICE — ELCTR BOVIE PENCIL HANDPIECE ROCKER SWITCH 15FT

## (undated) DEVICE — PREP CHLORAPREP HI-LITE ORANGE 26ML

## (undated) DEVICE — WARMING BLANKET UPPER ADULT

## (undated) DEVICE — PACK GENERAL LAPAROSCOPY

## (undated) DEVICE — ELCTR BOVIE PENCIL BLADE 10FT

## (undated) DEVICE — DRAPE 3/4 SHEET 52X76"

## (undated) DEVICE — XI DRAPE ARM

## (undated) DEVICE — XI VESSEL SEALER

## (undated) DEVICE — VENODYNE/SCD SLEEVE CALF MEDIUM

## (undated) DEVICE — INSUFFLATION NDL COVIDIEN SURGINEEDLE VERESS 120MM

## (undated) DEVICE — FORCEP RADIAL JAW 4 W NDL 2.2MM 2.8MM 240CM ORANGE DISP

## (undated) DEVICE — D HELP - CLEARVIEW CLEARIFY SYSTEM

## (undated) DEVICE — GLV 7 PROTEXIS (WHITE)

## (undated) DEVICE — MARKING PEN W RULER

## (undated) DEVICE — GOWN XL

## (undated) DEVICE — DRSG GAUZE PACKTNER ROLL

## (undated) DEVICE — ELCTR GROUNDING PAD ADULT COVIDIEN

## (undated) DEVICE — NDL SPINAL 22G X 3.5" (BLACK)

## (undated) DEVICE — XI SEAL UNIV 5- 8 MM

## (undated) DEVICE — XI OBTURATOR OPTICAL BLADELESS 8MM